# Patient Record
Sex: MALE | Race: WHITE
[De-identification: names, ages, dates, MRNs, and addresses within clinical notes are randomized per-mention and may not be internally consistent; named-entity substitution may affect disease eponyms.]

---

## 2021-01-22 ENCOUNTER — HOSPITAL ENCOUNTER (EMERGENCY)
Dept: HOSPITAL 79 - ER1 | Age: 41
Discharge: HOME | End: 2021-01-22
Payer: COMMERCIAL

## 2021-01-22 DIAGNOSIS — K57.32: Primary | ICD-10-CM

## 2021-01-22 LAB
BUN/CREATININE RATIO: 14 (ref 0–10)
HGB BLD-MCNC: 16.1 GM/DL (ref 14–17.5)
RED BLOOD COUNT: 5.31 M/UL (ref 4.2–5.5)
WHITE BLOOD COUNT: 16 K/UL (ref 4.5–11)

## 2021-03-09 ENCOUNTER — TRANSCRIBE ORDERS (OUTPATIENT)
Dept: ADMINISTRATIVE | Facility: HOSPITAL | Age: 41
End: 2021-03-09

## 2021-03-09 DIAGNOSIS — R13.10 PROBLEMS WITH SWALLOWING AND MASTICATION: Primary | ICD-10-CM

## 2021-03-11 ENCOUNTER — OFFICE VISIT (OUTPATIENT)
Dept: SURGERY | Facility: CLINIC | Age: 41
End: 2021-03-11

## 2021-03-11 VITALS
BODY MASS INDEX: 33.13 KG/M2 | WEIGHT: 250 LBS | HEIGHT: 73 IN | SYSTOLIC BLOOD PRESSURE: 135 MMHG | DIASTOLIC BLOOD PRESSURE: 90 MMHG

## 2021-03-11 DIAGNOSIS — L72.3 SEBACEOUS CYST: Primary | ICD-10-CM

## 2021-03-11 PROCEDURE — 99202 OFFICE O/P NEW SF 15 MIN: CPT | Performed by: SURGERY

## 2021-03-11 NOTE — PROGRESS NOTES
Subjective   Stone Leigh is a 40 y.o. male.     Chief Complaint: sebaceous cyst    History of Present Illness He has had a cyst on his back for years. It has not changed or had kari drainage. He has no symptoms from it.    The following portions of the patient's history were reviewed and updated as appropriate: current medications, past family history, past medical history, past social history, past surgical history and problem list.    Review of Systems    Objective   Physical Exam 2.5 cm likely sebaceous cyst on lower mid back. No redness or tenderness.     Assessment/Plan   Diagnoses and all orders for this visit:    1. Sebaceous cyst (Primary)    Discussed excision. If it grows or bothers him he will come back to excise it.